# Patient Record
Sex: FEMALE | Race: WHITE | Employment: UNEMPLOYED | URBAN - METROPOLITAN AREA
[De-identification: names, ages, dates, MRNs, and addresses within clinical notes are randomized per-mention and may not be internally consistent; named-entity substitution may affect disease eponyms.]

---

## 2018-02-12 PROBLEM — M53.3 SACRAL MASS: Status: ACTIVE | Noted: 2018-02-12

## 2018-02-15 RX ORDER — CEFAZOLIN SODIUM IN 0.9 % NACL 2 G/50 ML
2 INTRAVENOUS SOLUTION, PIGGYBACK (ML) INTRAVENOUS ONCE
Status: CANCELLED | OUTPATIENT
Start: 2018-03-23 | End: 2018-03-23

## 2018-03-20 ENCOUNTER — HOSPITAL ENCOUNTER (OUTPATIENT)
Dept: SURGERY | Age: 48
Discharge: HOME | End: 2018-03-20

## 2018-03-20 VITALS
HEART RATE: 70 BPM | WEIGHT: 123.3 LBS | OXYGEN SATURATION: 99 % | RESPIRATION RATE: 16 BRPM | DIASTOLIC BLOOD PRESSURE: 71 MMHG | BODY MASS INDEX: 23.28 KG/M2 | SYSTOLIC BLOOD PRESSURE: 120 MMHG | TEMPERATURE: 98.5 F | HEIGHT: 61 IN

## 2018-03-20 RX ORDER — LEVONORGESTREL AND ETHINYL ESTRADIOL 6-5-10
1 KIT ORAL
COMMUNITY

## 2018-03-20 NOTE — PERIOP NOTES
Type 1B surgery,  assessment complete. Labs per surgeon: none  Labs per anesthesia protocol: none  EKG: not required    Hibiclens and instructions given per hospital policy. Patient provided with and instructed on educational handouts including Guide to Surgery, Pain Management, Hand Hygiene, Blood Transfusion Education, and [facility] Brochure. Patient answered medical/surgical history questions at their best of ability. All prior to admission medications documented in [facility]. Patient instructed to hold all vitamins 7 days prior to surgery and NSAIDS 5 days prior to surgery, patient verbalized understanding. Medications to be held: none    Patient instructed to continue previous medications as prescribed prior to surgery and to take the following medications the day of surgery according to anesthesia guidelines with a small sip of water: none. Patient instructed on the following:  Arrive at [facility], time of arrival to be called the day before by 1700  NPO after midnight including gum, mints, and ice chips. Responsible adult must drive patient to the hospital, stay during surgery, and patient will require supervision 24 hours after anesthesia. Use non-moisturizing soap in shower the night before surgery and on the morning of surgery. Leave all valuables (money and jewelry) at home but bring insurance card and ID on       DOS. Do not wear make-up, nail polish, lotions, cologne, perfumes, powders, or oil on skin. Patient teach back successful and patient demonstrates knowledge of instruction.

## 2018-03-22 ENCOUNTER — ANESTHESIA EVENT (OUTPATIENT)
Dept: SURGERY | Age: 48
End: 2018-03-22
Payer: SELF-PAY

## 2018-03-23 ENCOUNTER — HOSPITAL ENCOUNTER (OUTPATIENT)
Age: 48
Setting detail: OUTPATIENT SURGERY
Discharge: HOME | End: 2018-03-23
Attending: SURGERY | Admitting: SURGERY
Payer: SELF-PAY

## 2018-03-23 ENCOUNTER — ANESTHESIA (OUTPATIENT)
Dept: SURGERY | Age: 48
End: 2018-03-23
Payer: SELF-PAY

## 2018-03-23 VITALS
SYSTOLIC BLOOD PRESSURE: 114 MMHG | HEART RATE: 55 BPM | OXYGEN SATURATION: 94 % | WEIGHT: 124.1 LBS | DIASTOLIC BLOOD PRESSURE: 60 MMHG | HEIGHT: 61 IN | RESPIRATION RATE: 19 BRPM | BODY MASS INDEX: 23.43 KG/M2 | TEMPERATURE: 97.6 F

## 2018-03-23 LAB — HCG UR QL: NEGATIVE

## 2018-03-23 PROCEDURE — 76010000138 HC OR TIME 0.5 TO 1 HR: Performed by: SURGERY

## 2018-03-23 PROCEDURE — 77030019940 HC BLNKT HYPOTHRM STRY -B: Performed by: ANESTHESIOLOGY

## 2018-03-23 PROCEDURE — 77030002916 HC SUT ETHLN J&J -A: Performed by: SURGERY

## 2018-03-23 PROCEDURE — 77030032490 HC SLV COMPR SCD KNE COVD -B: Performed by: SURGERY

## 2018-03-23 PROCEDURE — 74011250636 HC RX REV CODE- 250/636

## 2018-03-23 PROCEDURE — 76210000006 HC OR PH I REC 0.5 TO 1 HR: Performed by: SURGERY

## 2018-03-23 PROCEDURE — 76060000032 HC ANESTHESIA 0.5 TO 1 HR: Performed by: SURGERY

## 2018-03-23 PROCEDURE — 81025 URINE PREGNANCY TEST: CPT

## 2018-03-23 PROCEDURE — 74011250637 HC RX REV CODE- 250/637: Performed by: ANESTHESIOLOGY

## 2018-03-23 PROCEDURE — 74011250636 HC RX REV CODE- 250/636: Performed by: ANESTHESIOLOGY

## 2018-03-23 PROCEDURE — 77030019908 HC STETH ESOPH SIMS -A: Performed by: ANESTHESIOLOGY

## 2018-03-23 PROCEDURE — 88305 TISSUE EXAM BY PATHOLOGIST: CPT | Performed by: SURGERY

## 2018-03-23 PROCEDURE — 77030008467 HC STPLR SKN COVD -B: Performed by: SURGERY

## 2018-03-23 PROCEDURE — 77030008703 HC TU ET UNCUF COVD -A: Performed by: ANESTHESIOLOGY

## 2018-03-23 PROCEDURE — 74011000250 HC RX REV CODE- 250: Performed by: SURGERY

## 2018-03-23 PROCEDURE — 74011250636 HC RX REV CODE- 250/636: Performed by: SURGERY

## 2018-03-23 PROCEDURE — 74011000250 HC RX REV CODE- 250

## 2018-03-23 PROCEDURE — 77030020782 HC GWN BAIR PAWS FLX 3M -B: Performed by: ANESTHESIOLOGY

## 2018-03-23 PROCEDURE — 77030003029 HC SUT VCRL J&J -B: Performed by: SURGERY

## 2018-03-23 PROCEDURE — 77030018836 HC SOL IRR NACL ICUM -A: Performed by: SURGERY

## 2018-03-23 PROCEDURE — 76210000020 HC REC RM PH II FIRST 0.5 HR: Performed by: SURGERY

## 2018-03-23 PROCEDURE — 77030011640 HC PAD GRND REM COVD -A: Performed by: SURGERY

## 2018-03-23 RX ORDER — ONDANSETRON 2 MG/ML
INJECTION INTRAMUSCULAR; INTRAVENOUS AS NEEDED
Status: DISCONTINUED | OUTPATIENT
Start: 2018-03-23 | End: 2018-03-23 | Stop reason: HOSPADM

## 2018-03-23 RX ORDER — FENTANYL CITRATE 50 UG/ML
INJECTION, SOLUTION INTRAMUSCULAR; INTRAVENOUS AS NEEDED
Status: DISCONTINUED | OUTPATIENT
Start: 2018-03-23 | End: 2018-03-23 | Stop reason: HOSPADM

## 2018-03-23 RX ORDER — CEFAZOLIN SODIUM/WATER 2 G/20 ML
2 SYRINGE (ML) INTRAVENOUS ONCE
Status: COMPLETED | OUTPATIENT
Start: 2018-03-23 | End: 2018-03-23

## 2018-03-23 RX ORDER — OXYCODONE AND ACETAMINOPHEN 5; 325 MG/1; MG/1
1 TABLET ORAL AS NEEDED
Status: DISCONTINUED | OUTPATIENT
Start: 2018-03-23 | End: 2018-03-23 | Stop reason: HOSPADM

## 2018-03-23 RX ORDER — BUPIVACAINE HYDROCHLORIDE 2.5 MG/ML
INJECTION, SOLUTION EPIDURAL; INFILTRATION; INTRACAUDAL AS NEEDED
Status: DISCONTINUED | OUTPATIENT
Start: 2018-03-23 | End: 2018-03-23 | Stop reason: HOSPADM

## 2018-03-23 RX ORDER — DEXAMETHASONE SODIUM PHOSPHATE 4 MG/ML
INJECTION, SOLUTION INTRA-ARTICULAR; INTRALESIONAL; INTRAMUSCULAR; INTRAVENOUS; SOFT TISSUE AS NEEDED
Status: DISCONTINUED | OUTPATIENT
Start: 2018-03-23 | End: 2018-03-23 | Stop reason: HOSPADM

## 2018-03-23 RX ORDER — DIPHENHYDRAMINE HYDROCHLORIDE 50 MG/ML
12.5 INJECTION, SOLUTION INTRAMUSCULAR; INTRAVENOUS ONCE
Status: DISCONTINUED | OUTPATIENT
Start: 2018-03-23 | End: 2018-03-23 | Stop reason: HOSPADM

## 2018-03-23 RX ORDER — SODIUM CHLORIDE 0.9 % (FLUSH) 0.9 %
5-10 SYRINGE (ML) INJECTION AS NEEDED
Status: DISCONTINUED | OUTPATIENT
Start: 2018-03-23 | End: 2018-03-23 | Stop reason: HOSPADM

## 2018-03-23 RX ORDER — SODIUM CHLORIDE, SODIUM LACTATE, POTASSIUM CHLORIDE, CALCIUM CHLORIDE 600; 310; 30; 20 MG/100ML; MG/100ML; MG/100ML; MG/100ML
100 INJECTION, SOLUTION INTRAVENOUS CONTINUOUS
Status: DISCONTINUED | OUTPATIENT
Start: 2018-03-23 | End: 2018-03-23 | Stop reason: HOSPADM

## 2018-03-23 RX ORDER — HYDROMORPHONE HYDROCHLORIDE 2 MG/ML
0.5 INJECTION, SOLUTION INTRAMUSCULAR; INTRAVENOUS; SUBCUTANEOUS
Status: DISCONTINUED | OUTPATIENT
Start: 2018-03-23 | End: 2018-03-23 | Stop reason: HOSPADM

## 2018-03-23 RX ORDER — PROPOFOL 10 MG/ML
INJECTION, EMULSION INTRAVENOUS AS NEEDED
Status: DISCONTINUED | OUTPATIENT
Start: 2018-03-23 | End: 2018-03-23 | Stop reason: HOSPADM

## 2018-03-23 RX ORDER — MIDAZOLAM HYDROCHLORIDE 1 MG/ML
2 INJECTION, SOLUTION INTRAMUSCULAR; INTRAVENOUS
Status: DISCONTINUED | OUTPATIENT
Start: 2018-03-23 | End: 2018-03-23 | Stop reason: HOSPADM

## 2018-03-23 RX ORDER — FAMOTIDINE 20 MG/1
20 TABLET, FILM COATED ORAL ONCE
Status: COMPLETED | OUTPATIENT
Start: 2018-03-23 | End: 2018-03-23

## 2018-03-23 RX ORDER — NEOSTIGMINE METHYLSULFATE 1 MG/ML
INJECTION INTRAVENOUS AS NEEDED
Status: DISCONTINUED | OUTPATIENT
Start: 2018-03-23 | End: 2018-03-23 | Stop reason: HOSPADM

## 2018-03-23 RX ORDER — SODIUM CHLORIDE 0.9 % (FLUSH) 0.9 %
5-10 SYRINGE (ML) INJECTION EVERY 8 HOURS
Status: DISCONTINUED | OUTPATIENT
Start: 2018-03-23 | End: 2018-03-23 | Stop reason: HOSPADM

## 2018-03-23 RX ORDER — GLYCOPYRROLATE 0.2 MG/ML
INJECTION INTRAMUSCULAR; INTRAVENOUS AS NEEDED
Status: DISCONTINUED | OUTPATIENT
Start: 2018-03-23 | End: 2018-03-23 | Stop reason: HOSPADM

## 2018-03-23 RX ORDER — ROCURONIUM BROMIDE 10 MG/ML
INJECTION, SOLUTION INTRAVENOUS AS NEEDED
Status: DISCONTINUED | OUTPATIENT
Start: 2018-03-23 | End: 2018-03-23 | Stop reason: HOSPADM

## 2018-03-23 RX ORDER — APREPITANT 40 MG/1
40 CAPSULE ORAL ONCE
Status: DISCONTINUED | OUTPATIENT
Start: 2018-03-23 | End: 2018-03-23 | Stop reason: HOSPADM

## 2018-03-23 RX ORDER — LIDOCAINE HYDROCHLORIDE 10 MG/ML
0.1 INJECTION INFILTRATION; PERINEURAL AS NEEDED
Status: DISCONTINUED | OUTPATIENT
Start: 2018-03-23 | End: 2018-03-23 | Stop reason: HOSPADM

## 2018-03-23 RX ORDER — MIDAZOLAM HYDROCHLORIDE 1 MG/ML
2 INJECTION, SOLUTION INTRAMUSCULAR; INTRAVENOUS ONCE
Status: DISCONTINUED | OUTPATIENT
Start: 2018-03-23 | End: 2018-03-23 | Stop reason: HOSPADM

## 2018-03-23 RX ORDER — SODIUM CHLORIDE 9 MG/ML
50 INJECTION, SOLUTION INTRAVENOUS CONTINUOUS
Status: DISCONTINUED | OUTPATIENT
Start: 2018-03-23 | End: 2018-03-23 | Stop reason: HOSPADM

## 2018-03-23 RX ORDER — LIDOCAINE HYDROCHLORIDE 20 MG/ML
INJECTION, SOLUTION EPIDURAL; INFILTRATION; INTRACAUDAL; PERINEURAL AS NEEDED
Status: DISCONTINUED | OUTPATIENT
Start: 2018-03-23 | End: 2018-03-23 | Stop reason: HOSPADM

## 2018-03-23 RX ORDER — OXYCODONE HYDROCHLORIDE 5 MG/1
5 TABLET ORAL
Status: DISCONTINUED | OUTPATIENT
Start: 2018-03-23 | End: 2018-03-23 | Stop reason: HOSPADM

## 2018-03-23 RX ORDER — FENTANYL CITRATE 50 UG/ML
25 INJECTION, SOLUTION INTRAMUSCULAR; INTRAVENOUS ONCE
Status: DISCONTINUED | OUTPATIENT
Start: 2018-03-23 | End: 2018-03-23 | Stop reason: HOSPADM

## 2018-03-23 RX ADMIN — NEOSTIGMINE METHYLSULFATE 2 MG: 1 INJECTION INTRAVENOUS at 07:46

## 2018-03-23 RX ADMIN — FAMOTIDINE 20 MG: 20 TABLET, FILM COATED ORAL at 06:09

## 2018-03-23 RX ADMIN — LIDOCAINE HYDROCHLORIDE 60 MG: 20 INJECTION, SOLUTION EPIDURAL; INFILTRATION; INTRACAUDAL; PERINEURAL at 07:21

## 2018-03-23 RX ADMIN — Medication 2 G: at 07:22

## 2018-03-23 RX ADMIN — ROCURONIUM BROMIDE 30 MG: 10 INJECTION, SOLUTION INTRAVENOUS at 07:20

## 2018-03-23 RX ADMIN — LIDOCAINE HYDROCHLORIDE 40 MG: 20 INJECTION, SOLUTION EPIDURAL; INFILTRATION; INTRACAUDAL; PERINEURAL at 07:20

## 2018-03-23 RX ADMIN — NEOSTIGMINE METHYLSULFATE 2 MG: 1 INJECTION INTRAVENOUS at 07:44

## 2018-03-23 RX ADMIN — SODIUM CHLORIDE, SODIUM LACTATE, POTASSIUM CHLORIDE, AND CALCIUM CHLORIDE: 600; 310; 30; 20 INJECTION, SOLUTION INTRAVENOUS at 07:15

## 2018-03-23 RX ADMIN — SODIUM CHLORIDE 50 ML/HR: 900 INJECTION, SOLUTION INTRAVENOUS at 06:08

## 2018-03-23 RX ADMIN — PROPOFOL 200 MG: 10 INJECTION, EMULSION INTRAVENOUS at 07:20

## 2018-03-23 RX ADMIN — ONDANSETRON 4 MG: 2 INJECTION INTRAMUSCULAR; INTRAVENOUS at 07:42

## 2018-03-23 RX ADMIN — GLYCOPYRROLATE 0.4 MG: 0.2 INJECTION INTRAMUSCULAR; INTRAVENOUS at 07:44

## 2018-03-23 RX ADMIN — DEXAMETHASONE SODIUM PHOSPHATE 4 MG: 4 INJECTION, SOLUTION INTRA-ARTICULAR; INTRALESIONAL; INTRAMUSCULAR; INTRAVENOUS; SOFT TISSUE at 07:42

## 2018-03-23 RX ADMIN — FENTANYL CITRATE 50 MCG: 50 INJECTION, SOLUTION INTRAMUSCULAR; INTRAVENOUS at 07:20

## 2018-03-23 RX ADMIN — SODIUM CHLORIDE, SODIUM LACTATE, POTASSIUM CHLORIDE, AND CALCIUM CHLORIDE: 600; 310; 30; 20 INJECTION, SOLUTION INTRAVENOUS at 07:44

## 2018-03-23 NOTE — IP AVS SNAPSHOT
[id number] [place] [id number] 
[phone] Patient: Moreno Mckeon MRN: [id number] :1970 About your hospitalization You were admitted on:  2018 You last received care in the:  [facility] PACU You were discharged on:  2018 Why you were hospitalized Your primary diagnosis was:  Not on File Follow-up Information Follow up With Details Comments Contact Info Martin Moses MD   [address] 
[phone] Candace Wilkins MD Follow up in 10 day(s) follow up with dr gibson. [address] [place] [id number] [phone] Your Scheduled Appointments 2018  8:45 AM EDT [facility] Post Op with Candace Wilkins MD  
[facility] ([facility] [address] [place] [address]  
[phone] Discharge Orders None A check ernesto indicates which time of day the medication should be taken. My Medications CONTINUE taking these medications Instructions Each Dose to Equal  
 Morning Noon Evening Bedtime LEVONEST (28) 50-30 (6)/75-40 (5)/125-30(10) Tab Generic drug:  levonorgestrel-ethinyl estradiol Your last dose was: Your next dose is: Take 1 Tab by mouth. 1 Tab Discharge Instructions APPOINTMENT  AT 8;45 AM [facility] OFFICE WITH DR. Alexander Dorantes Leave dressings 5-6 days. Then remove, but keep incision covered daily until follow-up. Try to keep incision as dry as possible to lower risk of infection. No heavy lifting (>5lbs) for 5 weeks. No driving until you are off pain meds for 24hrs and have no pain with movements associated with driving. Pain prescription (Percocet) on chart for patient to use as needed for pain Follow-up with Dr Clemencia Lala in 10 days on a Monday in the office at: 
[facility] [address] 
(Call for an appt time-->[phone]-->option 1) Introducing [facility]! Shanice Chavez introduces [email or web address] patient portal. Now you can access parts of your medical record, email your doctor's office, and request medication refills online. 1. In your internet browser, go to [email or web address] 2. Click on the First Time User? Click Here link in the Sign In box. You will see the New Member Sign Up page. 3. Enter your [email or web address] Access Code exactly as it appears below. You will not need to use this code after youve completed the sign-up process. If you do not sign up before the expiration date, you must request a new code. · [email or web address] Access Code: [email or web address] Expires: 5/13/2018 12:42 PM 
 
4. Enter the last four digits of your Social Security Number (xxxx) and Date of Birth (mm/dd/yyyy) as indicated and click Submit. You will be taken to the next sign-up page. 5. Create a [employer] ID. This will be your [employer] login ID and cannot be changed, so think of one that is secure and easy to remember. 6. Create a [employer] password. You can change your password at any time. 7. Enter your Password Reset Question and Answer. This can be used at a later time if you forget your password. 8. Enter your e-mail address. You will receive e-mail notification when new information is available in [address] 9. Click Sign Up. You can now view and download portions of your medical record. 10. Click the Download Summary menu link to download a portable copy of your medical information. If you have questions, please visit the Frequently Asked Questions section of the [email or web address] website. Remember, [employer] is NOT to be used for urgent needs. For medical emergencies, dial 911. Now available from your iPhone and Android! Providers Seen During Your Hospitalization Provider Specialty Primary office phone Timbo Goncalves MD General Surgery [phone] Your Primary Care Physician (PCP) Primary Care Physician Office Phone Office Fax Ashutosh Kovacs [address] You are allergic to the following No active allergies Recent Documentation Height Weight BMI OB Status Smoking Status 1.549 m 56.3 kg 23.45 kg/m2 Having regular periods Never Smoker Emergency Contacts Name Discharge Info Relation Home Work Mobile Loree Iqbal  Spouse [3] [phone] Patient Belongings The following personal items are in your possession at time of discharge: 
  Dental Appliances: None  Visual Aid: None      Home Medications: None   Jewelry: None  Clothing: Footwear, Jacket/Coat, Pants, Shirt, Undergarments    Other Valuables: [place] Please provide this summary of care documentation to your next provider. Signatures-by signing, you are acknowledging that this After Visit Summary has been reviewed with you and you have received a copy. Patient Signature:  ____________________________________________________________ Date:  ____________________________________________________________  
  
Aneesh Mealing Provider Signature:  ____________________________________________________________ Date:  ____________________________________________________________

## 2018-03-23 NOTE — ANESTHESIA POSTPROCEDURE EVALUATION
Post-Anesthesia Evaluation and Assessment    Patient: Howie Ramos MRN: [id number]  SSN: [id number]    YOB: 1970  Age: 49 y.o. Sex: female       Cardiovascular Function/Vital Signs  Visit Vitals    /65 (BP 1 Location: Left arm, BP Patient Position: At rest)    Pulse (!) 48    Temp 36.4 °C (97.6 °F)    Resp 13    Ht 5' 1\" (1.549 m)    Wt 56.3 kg (124 lb 1.6 oz)    SpO2 96%    BMI 23.45 kg/m2       Patient is status post general anesthesia for Procedure(s):  SACRAL CYST  EXCISION . Nausea/Vomiting: None    Postoperative hydration reviewed and adequate. Pain:  Pain Scale 1: Numeric (0 - 10) (03/23/18 0818)  Pain Intensity 1: 0 (03/23/18 0818)   Managed    Neurological Status:   Neuro (WDL): Within Defined Limits (03/23/18 0818)   At baseline    Mental Status and Level of Consciousness: Arousable    Pulmonary Status:   O2 Device: Room air (03/23/18 0818)   Adequate oxygenation and airway patent    Complications related to anesthesia: None    Post-anesthesia assessment completed. No concerns. Pt cautioned to use alternative birth control this month.     Signed By: Betina Gutierrez MD     March 23, 2018

## 2018-03-23 NOTE — DISCHARGE INSTRUCTIONS
APPOINTMENT April 2,2018 AT 8;45 AM [facility] OFFICE WITH DR. Asaf Jon  Leave dressings 5-6 days. Then remove, but keep incision covered daily until follow-up. Try to keep incision as dry as possible to lower risk of infection. No heavy lifting (>5lbs) for 5 weeks. No driving until you are off pain meds for 24hrs and have no pain with movements associated with driving.     Pain prescription (Percocet) on chart for patient to use as needed for pain  Follow-up with Dr Nga Christensen in 10 days on a Monday in the office at:  [address], [address]  (Call for an appt time-->[phone]-->option 1)

## 2018-03-23 NOTE — H&P
H&P/Consult Note/Progress Note/Office Note:   Hawa Fajardo  MRN: [id number]  :1970  Age:47 y.o.    HPI: Hawa Fajardo is a 49 y.o. female who was referred by dermatology for evaluation of a sacral cyst.    She reports a several month history of noticing an enlarged nodular area involving her sacral region. She has no overlying skin drainage. She denies any sacral pilonidal surgery in the past.  She has required no incision and drainage procedures in the past.  She is not on blood thinners. She has no history of malignancy. PMHx: negative      Past Medical History:   Diagnosis Date    Adverse effect of anesthesia     requesting lightest form of anesthesia-- takes very little medication    OA (osteoarthritis) of hip     left    PONV (postoperative nausea and vomiting)      Past Surgical History:   Procedure Laterality Date    HX  SECTION      HX ORTHOPAEDIC Right     ORIF- pins/plate    HX ORTHOPAEDIC Right     pins removed R arm    HX TONSILLECTOMY  childhood     Current Facility-Administered Medications   Medication Dose Route Frequency    ceFAZolin (ANCEF) 2 g/20 mL in sterile water IV syringe  2 g IntraVENous ONCE    lidocaine (XYLOCAINE) 10 mg/mL (1 %) injection 0.1 mL  0.1 mL SubCUTAneous PRN    lactated Ringers infusion  100 mL/hr IntraVENous CONTINUOUS    lactated ringers bolus infusion 1,000 mL  1,000 mL IntraVENous ONCE PRN    0.9% sodium chloride infusion  50 mL/hr IntraVENous CONTINUOUS    sodium chloride (NS) flush 5-10 mL  5-10 mL IntraVENous Q8H    sodium chloride (NS) flush 5-10 mL  5-10 mL IntraVENous PRN    fentaNYL citrate (PF) injection 25 mcg  25 mcg IntraVENous ONCE    midazolam (VERSED) injection 2 mg  2 mg IntraVENous ONCE PRN    midazolam (VERSED) injection 2 mg  2 mg IntraVENous ONCE    aprepitant (EMEND) capsule 40 mg  40 mg Oral ONCE     Review of patient's allergies indicates no known allergies.   Social History     Social History    Marital status:      Spouse name: N/A    Number of children: N/A    Years of education: N/A     Social History Main Topics    Smoking status: Never Smoker    Smokeless tobacco: Never Used    Alcohol use 8.4 oz/week     14 Glasses of wine per week    Drug use: No    Sexual activity: Not Asked     Other Topics Concern    None     Social History Narrative     History   Smoking Status    Never Smoker   Smokeless Tobacco    Never Used     Family History   Problem Relation Age of Onset    Hypertension Mother     Diabetes Father     Cancer Sister      ovarian    No Known Problems Brother     No Known Problems Sister      ROS: The patient has no difficulty with chest pain or shortness of breath. No fever or chills. Comprehensive review of systems was otherwise unremarkable except as noted above. Physical Exam:   Visit Vitals    /61 (BP 1 Location: Right arm, BP Patient Position: At rest)    Pulse 66    Temp 98.1 °F (36.7 °C)    Resp (!) 6    Ht 5' 1\" (1.549 m)    Wt 124 lb 1.6 oz (56.3 kg)    LMP 02/28/2018 (Within Days)    SpO2 100%    BMI 23.45 kg/m2     Constitutional: Alert, oriented, cooperative patient in no acute distress; appears stated age    Eyes:Sclera are clear. EOMs intact  ENMT: no external lesions gross hearing normal; no obvious neck masses, no ear or lip lesions, nares normal  CV: RRR. Normal perfusion  Resp: No JVD. Breathing is  non-labored; no audible wheezing. GI: soft and non-distended  Sacral midline 1.5cm cystic soft tissue mass; No pilonidal sinus tracts; no cellulitis  No fluctuance     Musculoskeletal: unremarkable with normal function. No embolic signs or cyanosis.    Neuro:  Oriented; moves all 4; no focal deficits  Psychiatric: normal affect and mood, no memory impairment    Recent vitals (if inpt):  @IPVITALS(24:)@    Labs:  No results for input(s): WBC, HGB, PLT, NA, K, CL, CO2, BUN, CREA, GLU, PTP, INR, APTT, TBIL, TBILI, CBIL, SGOT, GPT, ALT, AP, AML, LPSE, LCAD, NH4, TROPT, TROIQ, PCO2, PO2, HCO3, HGBEXT, PLTEXT, HGBEXT, PLTEXT in the last 72 hours. No lab exists for component:  PH, INREXT, INREXT    No results found for: WBC, HGB, PLT, NA, K, CL, CO2, BUN, CREA, GLU, INR, APTT, TBIL, TBILI, CBIL, SGOT, GPT, ALT, AP, AML, LPSE, LCAD, NH4, TROPT, TROIQ, HGBEXT, PLTEXT, HGBEXT, PLTEXT    I reviewed recent labs and recent radiologic studies. I independently reviewed radiology images for studies I described above or studies I have ordered. Admission date (for inpatients): 3/23/2018   [unfilled]  [unfilled]    ASSESSMENT/PLAN:  Problem List  Date Reviewed: 2/12/2018          Codes Class Noted    Sacral mass ICD-10-CM: R22.2  ICD-9-CM: 724.6  2/12/2018            Active Problems:    * No active hospital problems. *      She has a sacral cyst in the midline. This may be up isolated pilonidal cyst.   I recommended its removal.    Informed consent was obtained. I will send the tissue to pathology for review.         Signed:  Ruddy Mata MD,  FACS

## 2018-03-23 NOTE — BRIEF OP NOTE
BRIEF OPERATIVE NOTE    Date of Procedure:  3/23/2018    Preoperative Diagnosis: Mass of sacrum [R22.2]  Postoperative Diagnosis: same    Procedure:  Excision of Sacral soft tissue mass  Surgeon(s) and Role:     * Gay Mckeon MD - Primary  Anesthesia: General  Estimated Blood Loss: <30cc  Specimens:   ID Type Source Tests Collected by Time Destination   1 : SACRAL SOFT TISSUE MASS Preservative Sacrum  Gay Mckeon MD 3/23/2018 3703 Pathology     Findings: see op note   Complications: none  Implants: * No implants in log *

## 2018-03-23 NOTE — OP NOTES
[facility] [place] REPORT    Winifred August  MR#: [id number]  : 1970  ACCOUNT #: [de-identified]   DATE OF SERVICE: 2018    PREOPERATIVE DIAGNOSIS:  Sacral soft tissue mass. POSTOPERATIVE DIAGNOSIS:  Sacral soft tissue mass. PROCEDURE:  Excision of sacral soft tissue mass. SURGEON:  Leonila Young MD    ASSISTANT:  None. ANESTHESIA:  General.     COMPLICATIONS:  None. ESTIMATED BLOOD LOSS:  Less than 30 mL. SPECIMENS:  Sent to pathology for review. IMPLANTS:  None. DESCRIPTION OF OPERATIVE PROCEDURE:  The patient was taken to the operating room, placed in supine position, and after adequate anesthesia was given, she was repositioned prone. Her sacrum was prepped and draped in a sterile fashion with multiple layers of Betadine scrub and Betadine solution. Timeout protocol was followed. She was given prophylactic antibiotics. A laquita-shaped incision was made in the midline of the sacrum over the soft tissue mass. The soft tissues were dissected to encounter a fairly large lobulated soft tissue mass, clinically consistent with a lipoma. There were several appendages of it. The mass was completely excised. It was about 4 cm in maximal dimension. It was sent to pathology for review. The excision site was inspected. There was no evidence of gross or palpable disease remaining. Irrigation was used. Hemostasis was confirmed. A local anesthetic was given. The incision was closed with deep dermal 3-0 Vicryl sutures and interrupted 3-0 nylon sutures. The wound was covered with a sterile 4 x 4 and Tegaderm. The patient was taken to recovery in good condition. She tolerated the procedure well. There were no immediate complications.       MD WINDY Meza / Myriam Snell  D: 2018 10:30     T: 2018 14:34  JOB #: [id number]

## 2018-03-23 NOTE — ANESTHESIA PREPROCEDURE EVALUATION
Anesthetic History     PONV          Review of Systems / Medical History  Patient summary reviewed and pertinent labs reviewed    Pulmonary  Within defined limits                 Neuro/Psych   Within defined limits           Cardiovascular                  Exercise tolerance: >4 METS     GI/Hepatic/Renal  Within defined limits              Endo/Other        Arthritis     Other Findings              Physical Exam    Airway  Mallampati: I  TM Distance: 4 - 6 cm  Neck ROM: normal range of motion   Mouth opening: Normal     Cardiovascular    Rhythm: regular  Rate: normal         Dental  No notable dental hx       Pulmonary  Breath sounds clear to auscultation               Abdominal  GI exam deferred       Other Findings            Anesthetic Plan    ASA: 2  Anesthesia type: general          Induction: Intravenous  Anesthetic plan and risks discussed with: Patient

## 2018-03-23 NOTE — IP AVS SNAPSHOT
[address] [place] [address] 
[phone] Patient: Lenora Avitia MRN: [id number] :1970 A check ernesto indicates which time of day the medication should be taken. My Medications CONTINUE taking these medications Instructions Each Dose to Equal  
 Morning Noon Evening Bedtime LEVONEST (28) 50-30 (6)/75-40 (5)/125-30(10) Tab Generic drug:  levonorgestrel-ethinyl estradiol Your last dose was: Your next dose is: Take 1 Tab by mouth. 1 Tab

## (undated) DEVICE — 2000CC GUARDIAN II: Brand: GUARDIAN

## (undated) DEVICE — TRAY PREP DRY W/ PREM GLV 2 APPL 6 SPNG 2 UNDPD 1 OVERWRAP

## (undated) DEVICE — 3M™ TEGADERM™ TRANSPARENT FILM DRESSING FRAME STYLE, 1626W, 4 IN X 4-3/4 IN (10 CM X 12 CM), 50/CT 4CT/CASE: Brand: 3M™ TEGADERM™

## (undated) DEVICE — SLIM BODY SKIN STAPLER: Brand: APPOSE ULC

## (undated) DEVICE — SOLUTION IV 1000ML 0.9% SOD CHL

## (undated) DEVICE — AMD ANTIMICROBIAL GAUZE SPONGES,12 PLY USP TYPE VII, 0.2% POLYHEXAMETHYLENE BIGUANIDE HCI (PHMB): Brand: CURITY

## (undated) DEVICE — BUTTON SWITCH PENCIL BLADE ELECTRODE, HOLSTER: Brand: EDGE

## (undated) DEVICE — SUT ETHLN 3-0 18IN PS2 BLK --

## (undated) DEVICE — KENDALL SCD EXPRESS SLEEVES, KNEE LENGTH, MEDIUM: Brand: KENDALL SCD

## (undated) DEVICE — SURGICAL PROCEDURE PACK BASIC ST FRANCIS

## (undated) DEVICE — REM POLYHESIVE ADULT PATIENT RETURN ELECTRODE: Brand: VALLEYLAB

## (undated) DEVICE — SUTURE VCRL SZ 3-0 L18IN ABSRB UD L26MM SH 1/2 CIR J864D

## (undated) DEVICE — NEEDLE HYPO 18GA L1.5IN PNK S STL HUB POLYPR SHLD REG BVL

## (undated) DEVICE — SHEET, T, LAPAROTOMY, STERILE: Brand: MEDLINE